# Patient Record
Sex: FEMALE | Race: WHITE | URBAN - METROPOLITAN AREA
[De-identification: names, ages, dates, MRNs, and addresses within clinical notes are randomized per-mention and may not be internally consistent; named-entity substitution may affect disease eponyms.]

---

## 2022-01-14 ENCOUNTER — OFFICE VISIT (OUTPATIENT)
Dept: URBAN - METROPOLITAN AREA CLINIC 51 | Facility: CLINIC | Age: 42
End: 2022-01-14
Payer: COMMERCIAL

## 2022-01-14 DIAGNOSIS — H17.13 CENTRAL CORNEAL OPACITY, BILATERAL: ICD-10-CM

## 2022-01-14 DIAGNOSIS — Z79.84 LONG TERM (CURRENT) USE OF ORAL HYPOGLYCEMIC DRUGS: ICD-10-CM

## 2022-01-14 DIAGNOSIS — H52.13 MYOPIA, BILATERAL: ICD-10-CM

## 2022-01-14 DIAGNOSIS — E11.9 TYPE 2 DIABETES MELLITUS W/O COMPLICATION: ICD-10-CM

## 2022-01-14 PROCEDURE — 99204 OFFICE O/P NEW MOD 45 MIN: CPT | Performed by: OPTOMETRIST

## 2022-01-14 RX ORDER — BESIFLOXACIN 6 MG/ML
0.6 % SUSPENSION OPHTHALMIC
Qty: 10 | Refills: 0 | Status: INACTIVE
Start: 2022-01-14 | End: 2022-01-20

## 2022-01-14 ASSESSMENT — KERATOMETRY
OS: 42.38
OD: 41.57

## 2022-01-14 ASSESSMENT — VISUAL ACUITY
OS: 20/20
OD: 20/20

## 2022-01-14 ASSESSMENT — INTRAOCULAR PRESSURE
OD: 15
OS: 14

## 2022-01-14 NOTE — IMPRESSION/PLAN
Impression: Type 2 diabetes mellitus w/o complication: Z05.7. Plan: No Background Diabetic Retinopathy, no Diabetic Macular Edema and no Neovascularization of the iris, disc, or elsewhere. Disc. ocular and systemic benefits of blood sugar control. The American Diabetes Association recommends target glycohemoglobin at 7.0% or less to minimize incidence of retinopathy as well as other systemic complications of diabetes mellitus. Send notes to PCP. Check annually.

## 2022-01-14 NOTE — IMPRESSION/PLAN
Impression: Myopia, bilateral: H52.13. Plan: Pt will be looking into refractive surgery considering multiple corneal ulcer history. Reviewed ICL over LASIK Will assess after ulcer has resolved. Suggest daily SCLs over monthly if she is to forgo refractive procedure.

## 2022-01-14 NOTE — IMPRESSION/PLAN
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
-Peripheral corneal ulcer SN OS; trace epi seam defect
-1+ cells, no flare Plan: Educated patient on findings. Recommended discontinuing Pred and starting Besivance QID OS. Pt to discontinue SCLs use until healed. Use ATs from a list of preferred brands. PRN STAT to urgent care if condition does not improve or worsens.  RTC in 1 week

## 2022-01-20 ENCOUNTER — OFFICE VISIT (OUTPATIENT)
Dept: URBAN - METROPOLITAN AREA CLINIC 51 | Facility: CLINIC | Age: 42
End: 2022-01-20
Payer: COMMERCIAL

## 2022-01-20 DIAGNOSIS — H16.002 UNSPECIFIED CORNEAL ULCER, LEFT EYE: Primary | ICD-10-CM

## 2022-01-20 PROCEDURE — 99214 OFFICE O/P EST MOD 30 MIN: CPT | Performed by: OPTOMETRIST

## 2022-01-20 NOTE — IMPRESSION/PLAN
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
-Epi defect is closed. Large stromal scar remaining at 11 o'clock OS
-No cells in [facility] OS Plan: Educated patient on findings. Recommended continuing Besivance QID OS, she reports she just started on Saturday, has issues with cost of med. Disp sample of Olfoxacin to use once Besivance runs out. RTC 1 week to monitor progression. PRN sooner if symptoms return or worsen.

## 2024-11-18 ENCOUNTER — OFFICE VISIT (OUTPATIENT)
Dept: URBAN - METROPOLITAN AREA CLINIC 51 | Facility: CLINIC | Age: 44
End: 2024-11-18
Payer: COMMERCIAL

## 2024-11-18 DIAGNOSIS — E11.9 TYPE 2 DIABETES MELLITUS WITHOUT COMPLICATIONS: Primary | ICD-10-CM

## 2024-11-18 DIAGNOSIS — H18.822: ICD-10-CM

## 2024-11-18 DIAGNOSIS — Z79.84 LONG TERM (CURRENT) USE OF ORAL HYPOGLYCEMIC DRUGS: ICD-10-CM

## 2024-11-18 DIAGNOSIS — H17.13 CENTRAL CORNEAL OPACITY, BILATERAL: ICD-10-CM

## 2024-11-18 DIAGNOSIS — H43.813 VITREOUS DEGENERATION, BILATERAL: ICD-10-CM

## 2024-11-18 PROCEDURE — 92134 CPTRZ OPH DX IMG PST SGM RTA: CPT | Performed by: OPTOMETRIST

## 2024-11-18 PROCEDURE — 92014 COMPRE OPH EXAM EST PT 1/>: CPT | Performed by: OPTOMETRIST

## 2024-11-18 RX ORDER — OFLOXACIN 3 MG/ML
0.3 % SOLUTION/ DROPS OPHTHALMIC
Qty: 10 | Refills: 0 | Status: ACTIVE
Start: 2024-11-18

## 2024-11-18 RX ORDER — PREDNISOLONE ACETATE 10 MG/ML
1 % SUSPENSION/ DROPS OPHTHALMIC
Qty: 10 | Refills: 1 | Status: INACTIVE
Start: 2024-11-18 | End: 2024-11-18

## 2024-11-18 ASSESSMENT — INTRAOCULAR PRESSURE
OS: 17
OD: 18

## 2024-11-18 ASSESSMENT — KERATOMETRY
OD: 41.88
OS: 42.38

## 2024-11-18 ASSESSMENT — VISUAL ACUITY
OS: 20/20
OD: 20/20